# Patient Record
Sex: FEMALE | Race: AMERICAN INDIAN OR ALASKA NATIVE
[De-identification: names, ages, dates, MRNs, and addresses within clinical notes are randomized per-mention and may not be internally consistent; named-entity substitution may affect disease eponyms.]

---

## 2021-09-23 ENCOUNTER — HOSPITAL ENCOUNTER (INPATIENT)
Dept: HOSPITAL 5 - APU | Age: 31
LOS: 3 days | Discharge: HOME | End: 2021-09-26
Attending: OBSTETRICS & GYNECOLOGY | Admitting: OBSTETRICS & GYNECOLOGY
Payer: MEDICAID

## 2021-09-23 DIAGNOSIS — Z3A.38: ICD-10-CM

## 2021-09-23 DIAGNOSIS — K66.0: ICD-10-CM

## 2021-09-23 DIAGNOSIS — Z30.2: ICD-10-CM

## 2021-09-23 DIAGNOSIS — Z20.822: ICD-10-CM

## 2021-09-23 DIAGNOSIS — O34.211: ICD-10-CM

## 2021-09-23 LAB
BASOPHILS # (AUTO): 0.1 K/MM3 (ref 0–0.1)
BASOPHILS NFR BLD AUTO: 0.5 % (ref 0–1.8)
EOSINOPHIL # BLD AUTO: 0.2 K/MM3 (ref 0–0.4)
EOSINOPHIL NFR BLD AUTO: 1.6 % (ref 0–4.3)
HCT VFR BLD CALC: 31.4 % (ref 30.3–42.9)
HGB BLD-MCNC: 10.8 GM/DL (ref 10.1–14.3)
LYMPHOCYTES # BLD AUTO: 1.5 K/MM3 (ref 1.2–5.4)
LYMPHOCYTES NFR BLD AUTO: 14.8 % (ref 13.4–35)
MCHC RBC AUTO-ENTMCNC: 34 % (ref 30–34)
MCV RBC AUTO: 91 FL (ref 79–97)
MONOCYTES # (AUTO): 0.8 K/MM3 (ref 0–0.8)
MONOCYTES % (AUTO): 7.6 % (ref 0–7.3)
PLATELET # BLD: 154 K/MM3 (ref 140–440)
RBC # BLD AUTO: 3.45 M/MM3 (ref 3.65–5.03)

## 2021-09-23 PROCEDURE — 36415 COLL VENOUS BLD VENIPUNCTURE: CPT

## 2021-09-23 PROCEDURE — U0003 INFECTIOUS AGENT DETECTION BY NUCLEIC ACID (DNA OR RNA); SEVERE ACUTE RESPIRATORY SYNDROME CORONAVIRUS 2 (SARS-COV-2) (CORONAVIRUS DISEASE [COVID-19]), AMPLIFIED PROBE TECHNIQUE, MAKING USE OF HIGH THROUGHPUT TECHNOLOGIES AS DESCRIBED BY CMS-2020-01-R: HCPCS

## 2021-09-23 PROCEDURE — 86900 BLOOD TYPING SEROLOGIC ABO: CPT

## 2021-09-23 PROCEDURE — 88302 TISSUE EXAM BY PATHOLOGIST: CPT

## 2021-09-23 PROCEDURE — 85018 HEMOGLOBIN: CPT

## 2021-09-23 PROCEDURE — 86901 BLOOD TYPING SEROLOGIC RH(D): CPT

## 2021-09-23 PROCEDURE — 88307 TISSUE EXAM BY PATHOLOGIST: CPT

## 2021-09-23 PROCEDURE — 85014 HEMATOCRIT: CPT

## 2021-09-23 PROCEDURE — 0UB70ZZ EXCISION OF BILATERAL FALLOPIAN TUBES, OPEN APPROACH: ICD-10-PCS | Performed by: OBSTETRICS & GYNECOLOGY

## 2021-09-23 PROCEDURE — 86850 RBC ANTIBODY SCREEN: CPT

## 2021-09-23 PROCEDURE — 85025 COMPLETE CBC W/AUTO DIFF WBC: CPT

## 2021-09-23 RX ADMIN — KETOROLAC TROMETHAMINE PRN MG: 30 INJECTION, SOLUTION INTRAMUSCULAR at 22:30

## 2021-09-23 RX ADMIN — MORPHINE SULFATE PRN MG: 2 INJECTION, SOLUTION INTRAMUSCULAR; INTRAVENOUS at 21:27

## 2021-09-23 NOTE — ANESTHESIA CONSULTATION
Anesthesia Consult and Med Hx


Date of service: 09/23/21





- Airway


Anesthetic Teeth Evaluation: Poor


ROM Head & Neck: Adequate


Mental/Hyoid Distance: Adequate


Mallampati Class: Class II


Intubation Access Assessment: Probably Good





- Pulmonary Exam


CTA: Yes





- Cardiac Exam


Cardiac Exam: RRR





- Pre-Operative Health Status


ASA Pre-Surgery Classification: ASA2


Proposed Anesthetic Plan: Spinal





- Pulmonary


Hx Smoking: Yes


Hx Asthma: Yes (Albuterol- last dose 09/21/21)


Hx Respiratory Symptoms: No


SOB: No


COPD: No


Home Oxygen Therapy: No


Hx Pneumonia: No


Hx Sleep Apnea: No





- Cardiovascular System


Hx Hypertension: No


Hx Coronary Artery Disease: No


Hx Heart Attack/AMI: No


Hx Angina: No


Hx Percutaneous Transluminal Coronary Angioplasty (PTCA): No


Hx Cardia Arrhythmia: No


Hx Pacemaker: No


Hx Internal Defibrillator: No


Hx Valvular Heart Disease: No


Hx Heart Murmur: No


Hx Peripheral Vascular Disease: No





- Central Nervous System


Hx Neuromuscular Disorder: No


Hx Seizures: No


CVA: No


Hx Back Pain: Yes


Hx Psychiatric Problems: Yes ([id number]+ years ago)





- Gastrointestinal


Hx Ulcer: No


Hx Gastroesophageal Reflux Disease: Yes





- Endocrine


Hx Renal Disease: No


Hx End Stage Renal Disease: No


Hx Cirrhosis: No


Hx Liver Disease: No


Hx Insulin Dependent Diabetes: No


Hx Non-Insulin Dependent Diabetes: No


Hx Thyroid Disease: No


Hx Hypothyroidism: No


Hx Hyperthyroidism: No





- Hematic


Hx Anemia: Yes


Hx Sickle Cell Disease: No





- Other Systems


Hx Alcohol Use: No


Hx Substance Use: No


Hx Cancer: No


Hx Obesity: Yes

## 2021-09-23 NOTE — HISTORY AND PHYSICAL REPORT
History of Present Illness


Date of examination: 21


Date of admission: 


21 08:43





Chief complaint: 


sched repeat c/section and BTL


History of present illness: 


 at 38.0wks with previous c/section x3 and severe pelvic pain that will 

not improve and causes her to ambulate with a limp, c/o contractions that are 

worsening.  Admits to fetal movement, denies leakage of fluid or vag bleed or 

headache.  Pt seen by [facility] and baby small for gestational age. Pt desires 

permanent sterilization.  Pt has history of postpartum hypertension, bilateral 

enlarged neck mass with normal TSH.  pt has mood disorder with depression and 

anxiety on zoloft and referred to psychologist and pt non-compliant with visits.

 History of asthma controlled with proventil meds. Prenatal labs with hgb 9, 

Rubella non-immune, HIV neg, RPR neg and HepBsAg neg





Past History


Past Medical History: asthma, other (Postpartum HTN, Depression, Anxiety, 

Anemia, enlarged neck mass.)


Past Surgical History:  section (x3)


Social history: no significant social history





- Obstetrical History


Expected Date of Delivery: 10/07/21


Actual Gestation: 38 Week(s) 0 Day(s) 


: 4


Para: 3


Number of Living Children: 3





Medications and Allergies


                                    Allergies











Allergy/AdvReac Type Severity Reaction Status Date / Time


 


No Known Allergies Allergy   Verified 01/26/15 22:58











                                Home Medications











 Medication  Instructions  Recorded  Confirmed  Last Taken  Type


 


Iron 1 tab PO DAILY 21 History


 


Prenatal One Daily Tablet 1 tab PO DAILY 21 History











Active Meds: 


Active Medications





Ephedrine Sulfate (Ephedrine Sulfate 50 Mg/1 Ml Inj)  10 mg IV Q2M PRN


   PRN Reason: Hypotension


Hydromorphone HCl (Hydromorphone 1 Mg/1 Ml Inj)  0.5 mg IV Q5M PRN


   PRN Reason: BREAK


   Stop: 21 12:03


Lactated Ringer's (Lactated Ringers)  1,000 mls @ 2,250 mls/hr IV PREOP REKHA


   Stop: 21 10:12


   Last Admin: 21 11:39 Dose:  2,250 mls/hr


   Documented by: 


Oxytocin/Sodium Chloride (Pitocin/Ns 30 Unit/500ml)  30 units in 500 mls @ 0 

mls/hr IV TITR REKHA; Protocol


Fentanyl/Bupivacaine/Sodium Chlor (Fentanyl-Bupiv 2 Mcg/Ml-0.125%)  200 mcg in 

100 mls @ 12 mls/hr EPIDURAL TITR REKHA; Protocol


Morphine Sulfate (Morphine 4 Mg/1 Ml Inj)  2.5 mg IV Q15M PRN


   PRN Reason: BREAK


   Stop: 21 12:03


Naloxone HCl (Naloxone 2 Mg/2 Ml Inj)  0.2 mg IV Q5M PRN


   PRN Reason: Respiratory sedation


Ondansetron HCl (Ondansetron 4 Mg/2 Ml Inj)  4 mg IV Q8H PRN


   PRN Reason: Nausea And Vomiting


Sodium Chloride (Sodium Chloride 0.9% 10 Ml Flush Syringe)  10 ml IV PRN REKHA











Review of Systems


All systems: negative (pain that she cannot walk and painful ctx)





- Vital Signs


Vital signs: 


                                   Vital Signs











Pulse BP


 


 89   113/72 


 


 21 09:21  21 09:21








                                        











Temp Pulse Resp BP Pulse Ox


 


 97.9 F   97 H  16   113/72   100 


 


 21 09:22  21 12:49  21 09:22  21 09:21  21 12:49














- Physical Exam


Breasts: Positive: deferred


Cardiovascular: Regular rate


Lungs: Positive: Normal air movement


Abdomen: Positive: normal appearance


Genitourinary (Female): Positive: normal external genitalia


Vulva: both: normal


Vagina: Positive: normal moisture


Uterus: Positive: enlarged (non-tender gravid)


Extremities: Positive: normal





- Obstetrical


FHR: category 1


Uterine Contraction Monitor Mode: External





Results


Result Diagrams: 


                                 21 09:36





                              Abnormal lab results











  21 Range/Units





  09:36 


 


RBC  3.45 L  (3.65-5.03)  M/mm3


 


Mono % (Auto)  7.6 H  (0.0-7.3)  %


 


Seg Neutrophils %  75.5 H  (40.0-70.0)  %








All other labs normal.








Assessment and Plan


Term preg with previous C/S x3, anemia, baby small for gest age and inability to

ambulate without limping due to severe pain; Desires no future fertility


1. Admit to Labor and delivery for repeat c/section


2. Anesthesia  and NICU notified


3. Consents signed 


All questions encouraged and answered

## 2021-09-23 NOTE — PROCEDURE NOTE
OB Delivery Note





- Delivery


Date of Delivery: 21


Surgeon: CHELI AHUMADA


Estimated blood loss: other (240cc)





-  Section


Preop diagnosis: repeat  (x3), desires sterilization (previous ), other

(IUP at 38.0wks)


Postop diagnosis: same (and left tube adherent to ovary and fimbriae to left 

adnexal adhesions filmy)


 section procedure: repeat low transverse, bilateral tubal ligation (via

salpingectomy)


Disposition: floor


Complications: none


Narrative: 


Date: 21


Surgeon: Cheli Ahumada MD


Preop Dx: IUP at 38.0wks, H/O C/Section x3, severe pelvic pain, desires 

permanent sterilization


Postop Dx: same and partial separation of placenta, Window in lower uterine 

segment, indicating labor


Procedure : Repeat low transverse  section and bilateral tubal 

sterilization via salpingectomy with lysis of adhesions


Anesthesia: Spinal


Intake: 1250cc


Output:400cc 


EBL:240cc





After the risks, benefits and alternatives of procedure discussed, patient 

signed consents and was taken to the operating room.  Pt was given spinal 

anesthesia.  After same was adequate, patient was prepped and draped in the 

usual sterile fashion.  Thomas catheter in place and draining clear urine.


Pt was given prophylactic antibiotic per protocol and time out was done


Pfannenstiel skin incision was made and taken sharply through the previous scar 

down to the fascia and the incision extended using electrocautery.  Superior 

edge of the fascia was grasped with kocher clamps and the rectus muscle 

 using electrocautery.  Lower portion of the fascia also  

sharply.  Rectus muscle  in the midline and Peritoneal cavity entered 

sharply and same extended with good visualization of the bladder.  The bladder 

flap was created sharply using metzenbaum scissors and bladder blade placed.


Lower uterine segment with window approx 3cm, incision made transversely 

superior to the window and same extended using bandage scissors.  The amniotic 

sac was entered using allys clamps.   Infant delivered, bulb suctioned, cord 

clamped and baby handed to waiting pediatricians.  Placenta noted to be 

partially  and same delivered completely and uterine cavity cleared of 

all clots and debri.  The uterus was not exteriorized and closed in 1 layers 

using 0-monocryl suture in a running locked fashion and then an additional layer

figure of 8 sutures x3 placed to right and left corners of incision and 

centrally.  Excellent hemostasis noted.


Attention turned to left adnexa and tortuous tube noted, avascular portion of 

visible small tubal segment entered and 1cm tube excised and free ends doubly 

ligated with 0-vicryl suture; the distal portion of the tube noted to be adhere

nt to the ovary partially with filmy adhesion and same  using 

electrocautery and distal portion of the tube grasped with irene and filmy 

adhesion sharply  using metzenbaum scissors.  Fimbriae clamped and then

transcected and doubly suture ligated with 0-vicyl.  The remaining central 

segment was then removed using electrocautery.  Mesosalpinx remain hemostatic.  

Both free ends visualized when replaced in the abdomen and remain with excellent

hemostasis.


Attention turned to right tube and same followed out to it's fimbriated end.  

Avascular portion of tube entered and distal tubal segment removed with fimbriae

attached.  Both free ends remaining doubly clamped and suture ligated using 0-

vicryl suture.


The kali retractor was removed and anterior peritoneum, scar tissue and rectus

muscle reapproximated using 0-vicryl in a continous fashion.


Rectus fascia closed with 0-vicryl suture in a continuous fashion and 

subcutaneous tissue copiously irrigated with normal saline and re-approximated 

using 3-0 vicryl suture.  Excellent hemostasis remains.  The skin was closed 

with 4-0 monocryl suture and steristrips placed with pressure dressing.  Sponge,

lap, instrument and needle counts x3 were correct.  Patient tolerated the 

procedure well and was taken to recovery room stable.





Findings:  Viable female infant, APGARS 8/9 and weight 2600g.  Normal uterus, 

left tube adherent to ovary and filmy adhesions; right tube wnl and both ovaries

normal.





- Infant


  ** A


Apgar at 1 minute: 8


Apgar at 5 minutes: 9


Infant Gender: Female (2600g)

## 2021-09-23 NOTE — ANESTHESIA DAY OF SURGERY
Anesthesia Day of Surgery





- Day of Surgery


Patient Examined: Yes


Patient H&P Reviewed: Yes


Patient is NPO: Yes


Beta Blockers: No


Cardiac Clearance: No


Pulmonary Clearance: No


Win's Test: Negative

## 2021-09-24 LAB
HCT VFR BLD CALC: 30.5 % (ref 30.3–42.9)
HGB BLD-MCNC: 10.3 GM/DL (ref 10.1–14.3)

## 2021-09-24 RX ADMIN — MORPHINE SULFATE PRN MG: 2 INJECTION, SOLUTION INTRAMUSCULAR; INTRAVENOUS at 03:00

## 2021-09-24 RX ADMIN — KETOROLAC TROMETHAMINE PRN MG: 30 INJECTION, SOLUTION INTRAMUSCULAR at 17:00

## 2021-09-24 RX ADMIN — OXYCODONE AND ACETAMINOPHEN PRN TAB: 5; 325 TABLET ORAL at 09:14

## 2021-09-24 RX ADMIN — OXYCODONE AND ACETAMINOPHEN PRN TAB: 5; 325 TABLET ORAL at 04:47

## 2021-09-24 RX ADMIN — OXYCODONE AND ACETAMINOPHEN PRN TAB: 5; 325 TABLET ORAL at 20:14

## 2021-09-24 RX ADMIN — FERROUS SULFATE TAB 325 MG (65 MG ELEMENTAL FE) SCH MG: 325 (65 FE) TAB at 09:13

## 2021-09-24 RX ADMIN — SERTRALINE SCH MG: 25 TABLET, FILM COATED ORAL at 10:50

## 2021-09-24 RX ADMIN — KETOROLAC TROMETHAMINE PRN MG: 30 INJECTION, SOLUTION INTRAMUSCULAR at 10:49

## 2021-09-24 NOTE — PROGRESS NOTE
Assessment and Plan


A: Postpartum/postop day 1 S/P repeat  section.


Anemia.


P: Encouraged patient to ambulate. 


Patient to drink warm liquids. 


Mylicon for gas pain. 


Iron supplementation. 


Continue routine postpartum/postop care. 








Subjective





- Subjective


Date of service: 21


Principal diagnosis: Postpartum/postop day 1 S/P repeat C/S


Patient reports: appetite normal, voiding normally, pain well controlled, 

ambulating normally, no dizzy ambulation, no flatus, no nauseated


[place]: doing well





Objective





- Vital Signs


Latest vital signs: 


                                   Vital Signs











  Temp Pulse Resp BP BP Pulse Ox Pulse Ox


 


 21 09:14  98.4 F  85  18  116/72   100 


 


 21 08:00        98


 


 21 04:47    20    


 


 21 04:40  98.6 F  83  20  129/84   82 L 


 


 21 03:00    20    


 


 21 01:02  98.6 F  80  20  117/69   98 


 


 21 22:30    20    


 


 21 21:27    20    


 


 21 20:35  98.7 F   20   125/65  98  98


 


 21 19:11     120/70   








                                Intake and Output











 21





 07:59 15:59 23:59


 


Intake Total 360 240 


 


Output Total 2100  


 


Balance -1740 240 


 


Intake:   


 


  Oral 360 240 


 


Output:   


 


  Urine 2100  


 


    Indwelling Catheter 1800  


 


    Void 300  


 


Other:   


 


  Total, Intake Amount 120 240 


 


  Total, Output Amount 300  


 


  # Voids   


 


    Void 1  














- Exam


Cardiovascular: Present: Regular rate


Lungs: Present: Clear to auscultation


Abdomen: Present: normal appearance, soft, normal bowel sounds.  Absent: 

distention, tenderness, guarding, rigidity


Uterus: Present: normal, firm, fundal height below umbilicus.  Absent: bog

giness, tenderness


Extremities: Absent: tenderness


Incision: Present: dry, dressed

## 2021-09-24 NOTE — EVENT NOTE
Date: 09/24/21


Went to see patient for postpartum rounds and patient was not in her room. Will 

try again at a later time.

## 2021-09-25 RX ADMIN — SERTRALINE SCH MG: 25 TABLET, FILM COATED ORAL at 10:46

## 2021-09-25 RX ADMIN — SENNOSIDES PRN MG: 8.6 TABLET, FILM COATED ORAL at 04:26

## 2021-09-25 RX ADMIN — OXYCODONE AND ACETAMINOPHEN PRN TAB: 5; 325 TABLET ORAL at 23:46

## 2021-09-25 RX ADMIN — FERROUS SULFATE TAB 325 MG (65 MG ELEMENTAL FE) SCH MG: 325 (65 FE) TAB at 10:46

## 2021-09-25 RX ADMIN — OXYCODONE AND ACETAMINOPHEN PRN TAB: 5; 325 TABLET ORAL at 12:52

## 2021-09-25 RX ADMIN — OXYCODONE AND ACETAMINOPHEN PRN TAB: 5; 325 TABLET ORAL at 01:11

## 2021-09-25 RX ADMIN — IBUPROFEN PRN MG: 800 TABLET, FILM COATED ORAL at 04:26

## 2021-09-25 RX ADMIN — IBUPROFEN PRN MG: 800 TABLET, FILM COATED ORAL at 16:30

## 2021-09-25 NOTE — PROGRESS NOTE
Assessment and Plan





routine postop care


d/c to home tomorrow


JAIDA Nelson MD





Subjective





- Subjective


Date of service: 21


Principal diagnosis: Postpartum/postop day 1 S/P repeat C/S


Patient reports: appetite normal, voiding normally, pain well controlled, 

ambulating normally


[place]: doing well





Objective





- Vital Signs


Latest vital signs: 


                                   Vital Signs











  Temp Pulse Resp BP BP Pulse Ox Pulse Ox


 


 21 10:30        98


 


 21 08:50        98


 


 21 07:49  98.1 F  81  18  124/91   98 


 


 21 04:26    20    


 


 21 01:11    20    


 


 21 00:30  98.6 F  69  16   105/78  


 


 21 20:15        98


 


 21 20:14    20    








                                Intake and Output











 21





 23:59 07:59 15:59


 


Intake Total  780 360


 


Output Total   400


 


Balance  780 -40


 


Intake:   


 


  Intake, Free Water  780 360


 


Output:   


 


  Urine   400


 


    Void   400


 


Other:   


 


  Total, Output Amount   400


 


  # Voids   


 


    Void  1 1














- Exam


Breasts: Present: deferred


Cardiovascular: Present: Regular rate


Lungs: Present: Clear to auscultation


Abdomen: Present: normal appearance, distention, normal bowel sounds


Uterus: Present: fundal height below umbilicus


Extremities: Present: normal


Deep Tendon Reflex Grade: Normal but brisk +3


Incision: Present: normal, dry, intact

## 2021-09-26 VITALS — DIASTOLIC BLOOD PRESSURE: 82 MMHG | SYSTOLIC BLOOD PRESSURE: 131 MMHG

## 2021-09-26 RX ADMIN — IBUPROFEN PRN MG: 800 TABLET, FILM COATED ORAL at 04:25

## 2021-09-26 RX ADMIN — SERTRALINE SCH: 25 TABLET, FILM COATED ORAL at 10:00

## 2021-09-26 RX ADMIN — OXYCODONE AND ACETAMINOPHEN PRN TAB: 5; 325 TABLET ORAL at 14:02

## 2021-09-26 RX ADMIN — FERROUS SULFATE TAB 325 MG (65 MG ELEMENTAL FE) SCH: 325 (65 FE) TAB at 10:00

## 2021-09-26 RX ADMIN — SENNOSIDES PRN MG: 8.6 TABLET, FILM COATED ORAL at 04:25

## 2021-09-26 NOTE — PROGRESS NOTE
Assessment and Plan


A: Postpartum/postop day 3 S/P repeat LTCS with bilateral tubal sterilization. 


Anemia.


P: Discharge patient home today. Discussed with patient postpartum/postop 

discharge instructions and warning signs in detail. Care of incision and 

activity restrictions discussed with patient. Advised patient to continue taking

her prenatal vitamins and iron supplements at home. Advised patient to avoid 

intercourse, lifting, housework, driving, stair climbing, and tub baths (patient

may take showers). Follow up at [facility] office in 1-2 days. Patient 

voiced understanding of all instructions. 








Subjective





- Subjective


Date of service: 21


Principal diagnosis: Postpartum/postop day 3 S/P repeat C/S with bilateral tubal

sterilization


Interval history: 


Patient requests discharge home today. 


Patient denies any symptoms of depression or anxiety. Patient declined case 

management and  consults.


Patient denies headache, visual disturbance, nausea or vomiting, chest pain, 

shortness of breath, heavy bleeding, or any other problems. 


Patient states she has good support network at home (states her sister plans to 

help her in the postpartum period). 








Patient reports: appetite normal, voiding normally, pain well controlled, 

flatus, ambulating normally, no dizzy ambulation, no nauseated


: doing well, bottle feeding





Objective





- Vital Signs


Latest vital signs: 


                                   Vital Signs











  Temp Pulse Resp BP BP Pulse Ox Pulse Ox


 


 21 09:04  98.1 F  76  19   131/82  96 


 


 21 08:23        98


 


 21 04:25    20    


 


 21 00:11  98.0 F  76  16  111/71   97 


 


 21 23:46    20    


 


 21 19:40        98


 


 21 16:11  98.5 F  74  18  135/92   98 








                                Intake and Output











 21





 23:59 07:59 15:59


 


Intake Total 360 240 120


 


Balance 360 240 120


 


Intake:   


 


  Oral 120  


 


  Intake, Free Water 240 240 120


 


Other:   


 


  Total, Intake Amount 120  


 


  # Voids   


 


    Void 1 1 1














- Exam


Cardiovascular: Present: Regular rate


Lungs: Present: Clear to auscultation


Abdomen: Present: normal appearance, soft, normal bowel sounds.  Absent: 

distention, tenderness, guarding, rigidity


Uterus: Present: normal, firm, fundal height below umbilicus.  Absent: 

bogginess, tenderness


Extremities: Present: normal, edema (trace pedal edema bilaterally).  Absent: 

tenderness


Incision: Present: normal, dry, intact